# Patient Record
Sex: MALE | Race: WHITE | NOT HISPANIC OR LATINO | Employment: FULL TIME | URBAN - METROPOLITAN AREA
[De-identification: names, ages, dates, MRNs, and addresses within clinical notes are randomized per-mention and may not be internally consistent; named-entity substitution may affect disease eponyms.]

---

## 2024-06-14 ENCOUNTER — OFFICE VISIT (OUTPATIENT)
Age: 64
End: 2024-06-14

## 2024-06-14 VITALS
SYSTOLIC BLOOD PRESSURE: 125 MMHG | BODY MASS INDEX: 31.67 KG/M2 | HEART RATE: 64 BPM | WEIGHT: 226.2 LBS | DIASTOLIC BLOOD PRESSURE: 82 MMHG | HEIGHT: 71 IN | OXYGEN SATURATION: 98 %

## 2024-06-14 DIAGNOSIS — Z11.59 NEED FOR HEPATITIS C SCREENING TEST: ICD-10-CM

## 2024-06-14 DIAGNOSIS — Z11.4 SCREENING FOR HIV (HUMAN IMMUNODEFICIENCY VIRUS): ICD-10-CM

## 2024-06-14 DIAGNOSIS — Z13.6 SCREENING FOR CARDIOVASCULAR CONDITION: ICD-10-CM

## 2024-06-14 DIAGNOSIS — Z12.5 SCREENING FOR MALIGNANT NEOPLASM OF PROSTATE: ICD-10-CM

## 2024-06-14 DIAGNOSIS — Z12.11 SCREENING FOR MALIGNANT NEOPLASM OF COLON: ICD-10-CM

## 2024-06-14 DIAGNOSIS — J30.1 SEASONAL ALLERGIC RHINITIS DUE TO POLLEN: Primary | ICD-10-CM

## 2024-06-14 PROCEDURE — 99203 OFFICE O/P NEW LOW 30 MIN: CPT | Performed by: FAMILY MEDICINE

## 2024-06-14 NOTE — ASSESSMENT & PLAN NOTE
Avoid irritants. Avoid touching face and rubbing eyes or nose. Close windows in home and car when pollen counts are higher.

## 2024-06-14 NOTE — PROGRESS NOTES
"Ambulatory Visit  Name: Андрей Mcallister      : 1960      MRN: [id number]  Encounter Provider: Luis Alberto Valverde MD  Encounter Date: 2024   Encounter department: [facility]IMARY [facility]ARE    Assessment & Plan   1. Seasonal allergic rhinitis due to pollen  Assessment & Plan:  Avoid irritants. Avoid touching face and rubbing eyes or nose. Close windows in home and car when pollen counts are higher.   2. Screening for cardiovascular condition  -     Lipid panel; Future  -     Comprehensive metabolic panel; Future; Expected date: 2024  3. Screening for malignant neoplasm of prostate  -     Ambulatory Referral to Gastroenterology; Future  4. Screening for malignant neoplasm of colon  -     PSA, total and free; Future  5. Need for hepatitis C screening test  -     Hepatitis C Antibody; Future  6. Screening for HIV (human immunodeficiency virus)  -     HIV 1/2 AG/AB w Reflex [facility] for 2 yr old and above; Future       History of Present Illness     New patient. Here to get established. Denies chest pain, shortness of breath, headache, or visual symptoms. Reviewed and updated PMHx, PSHx, Family Hx, Allergies, and Meds.  Eats healthy, balanced. Goes to gym 4 times a week. On no medication. No doctor for 15 days.         Review of Systems   Constitutional:  Negative for fatigue.   Respiratory:  Negative for shortness of breath.    Cardiovascular:  Negative for chest pain.   Gastrointestinal:  Negative for abdominal pain, constipation and diarrhea.   Genitourinary:  Negative for dysuria.   Neurological:  Negative for dizziness.     Medical History Reviewed by provider this encounter:  Tobacco  Allergies  Meds  Problems  Med Hx  Surg Hx  Fam Hx  Soc   Hx      Objective     /82 (BP Location: Right arm, Patient Position: Sitting, Cuff Size: Standard)   Pulse 64   Ht 5' 11\" (1.803 m)   Wt 103 kg (226 lb 3.2 oz)   SpO2 98%   BMI 31.55 kg/m²     Physical Exam  Vitals and nursing note " reviewed.   Constitutional:       Appearance: He is well-developed.   HENT:      Head: Normocephalic.   Eyes:      General: Scleral icterus: scr prostate can.      Conjunctiva/sclera: Conjunctivae normal.   Cardiovascular:      Rate and Rhythm: Normal rate and regular rhythm.   Pulmonary:      Breath sounds: Normal breath sounds.   Abdominal:      Palpations: Abdomen is soft.   Musculoskeletal:      Cervical back: Neck supple.   Neurological:      Mental Status: He is alert.       Administrative Statements

## 2024-08-07 LAB
EXTERNAL HIV CONFIRMATION: NORMAL
EXTERNAL HIV SCREEN: NORMAL
HCV AB SER-ACNC: NEGATIVE

## 2024-09-13 ENCOUNTER — OFFICE VISIT (OUTPATIENT)
Age: 64
End: 2024-09-13
Payer: COMMERCIAL

## 2024-09-13 VITALS
HEART RATE: 66 BPM | OXYGEN SATURATION: 95 % | SYSTOLIC BLOOD PRESSURE: 150 MMHG | WEIGHT: 230.8 LBS | RESPIRATION RATE: 18 BRPM | DIASTOLIC BLOOD PRESSURE: 82 MMHG | HEIGHT: 71 IN | BODY MASS INDEX: 32.31 KG/M2

## 2024-09-13 DIAGNOSIS — E78.00 HYPERCHOLESTEROLEMIA: ICD-10-CM

## 2024-09-13 DIAGNOSIS — Z00.00 ANNUAL PHYSICAL EXAM: Primary | ICD-10-CM

## 2024-09-13 DIAGNOSIS — R73.01 IMPAIRED FASTING BLOOD SUGAR: ICD-10-CM

## 2024-09-13 DIAGNOSIS — R74.8 ELEVATED LIVER ENZYMES: ICD-10-CM

## 2024-09-13 DIAGNOSIS — J30.1 SEASONAL ALLERGIC RHINITIS DUE TO POLLEN: ICD-10-CM

## 2024-09-13 DIAGNOSIS — R97.20 PSA ELEVATION: ICD-10-CM

## 2024-09-13 DIAGNOSIS — R35.0 FREQUENCY OF URINATION AND POLYURIA: ICD-10-CM

## 2024-09-13 DIAGNOSIS — R35.89 FREQUENCY OF URINATION AND POLYURIA: ICD-10-CM

## 2024-09-13 PROBLEM — J30.9 ALLERGIC RHINITIS: Status: ACTIVE | Noted: 2024-09-13

## 2024-09-13 PROCEDURE — 99396 PREV VISIT EST AGE 40-64: CPT | Performed by: FAMILY MEDICINE

## 2024-09-13 RX ORDER — MULTIVITAMIN
1 CAPSULE ORAL DAILY
COMMUNITY

## 2024-09-13 RX ORDER — BIOTIN 10 MG
TABLET ORAL
COMMUNITY

## 2024-09-13 RX ORDER — CHLORAL HYDRATE 500 MG
1000 CAPSULE ORAL DAILY
COMMUNITY

## 2024-09-13 NOTE — PROGRESS NOTES
Adult Annual Physical  Name: Андрей Mcallister      : 1960      MRN: [id number]  Encounter Provider: Luis Alberto Valverde MD  Encounter Date: 2024   Encounter department: [facility] PRIMARY CARE    Assessment & Plan  Annual physical exam         Seasonal allergic rhinitis due to pollen  Avoid irritants. Avoid touching face and rubbing eyes or nose. Close windows in home and car when pollen counts are higher.          Frequency of urination and polyuria  Refer to urology.       PSA elevation  Refer to urology.       Impaired fasting blood sugar  ADA. Will monitor.       Hypercholesterolemia  Low cholesterol diet.       Elevated liver enzymes  Stop alcohol       Immunizations and preventive care screenings were discussed with patient today. Appropriate education was printed on patient's after visit summary.        Counseling:  Exercise: the importance of regular exercise/physical activity was discussed. Recommend exercise 3-5 times per week for at least 30 minutes.          History of Present Illness     Adult Annual Physical:  Patient presents for annual physical.   Андрей Mcallister is here for annual exam.   Pt states he would like to go over the blood work. Pt states he is still having pain in the prostate area with frequent urination.Denies chest pain, shortness of breath, headache, or visual symptoms. Reviewed and updated PMHx, PSHx, Family Hx, Allergies, and Meds.  Had 6 polyps removed and due for colonoscopy in 6 mos. .     Diet and Physical Activity:  - Diet/Nutrition: consuming 3-5 servings of fruits/vegetables daily and well balanced diet.  - Exercise: 3-4 times a week on average.    Depression Screening:  - PHQ-2 Score: 0    General Health:  - Sleep: 7-8 hours of sleep on average.  - Hearing: normal hearing right ear and normal hearing left ear.  - Vision: wears glasses and contacts.  - Dental: no dental visits for > 1 year.     Health:  - History of STDs: no.   - Urinary symptoms:  "urinary frequency and nocturia.     Advanced Care Planning:  - Has an advanced directive?: yes    - Has a durable medical POA?: no    - ACP document given to patient?: no      Review of Systems   Constitutional:  Negative for fatigue.   Respiratory:  Negative for shortness of breath.    Cardiovascular:  Negative for chest pain.   Gastrointestinal:  Negative for abdominal pain, constipation and diarrhea.   Genitourinary:  Negative for dysuria.   Neurological:  Negative for dizziness.         Objective     /82 (BP Location: Right arm, Patient Position: Sitting, Cuff Size: Large)   Pulse 66   Resp 18   Ht 5' 11\" (1.803 m)   Wt 105 kg (230 lb 12.8 oz)   SpO2 95%   BMI 32.19 kg/m²     Physical Exam  Vitals and nursing note reviewed.   Constitutional:       Appearance: He is well-developed.   HENT:      Head: Normocephalic.   Eyes:      Conjunctiva/sclera: Conjunctivae normal.   Cardiovascular:      Rate and Rhythm: Normal rate and regular rhythm.   Pulmonary:      Breath sounds: Normal breath sounds.   Abdominal:      Palpations: Abdomen is soft.   Musculoskeletal:      Cervical back: Neck supple.   Neurological:      Mental Status: He is alert.         "

## 2024-09-13 NOTE — PATIENT INSTRUCTIONS
"Patient Education     Routine physical for adults   The Basics   Written by the doctors and editors at [facility]   What is a physical? -- A physical is a routine visit, or \"check-up,\" with your doctor. You might also hear it called a \"wellness visit\" or \"preventive visit.\"  During each visit, the doctor will:   Ask about your physical and mental health   Ask about your habits, behaviors, and lifestyle   Do an exam   Give you vaccines if needed   Talk to you about any medicines you take   Give advice about your health   Answer your questions  Getting regular check-ups is an important part of taking care of your health. It can help your doctor find and treat any problems you have. But it's also important for preventing health problems.  A routine physical is different from a \"sick visit.\" A sick visit is when you see a doctor because of a health concern or problem. Since physicals are scheduled ahead of time, you can think about what you want to ask the doctor.  How often should I get a physical? -- It depends on your age and health. In general, for people age 21 years and older:   If you are younger than 50 years, you might be able to get a physical every 3 years.   If you are 50 years or older, your doctor might recommend a physical every year.  If you have an ongoing health condition, like diabetes or high blood pressure, your doctor will probably want to see you more often.  What happens during a physical? -- In general, each visit will include:   Physical exam - The doctor or nurse will check your height, weight, heart rate, and blood pressure. They will also look at your eyes and ears. They will ask about how you are feeling and whether you have any symptoms that bother you.   Medicines - It's a good idea to bring a list of all the medicines you take to each doctor visit. Your doctor will talk to you about your medicines and answer any questions. Tell them if you are having any side effects that bother you. You " "should also tell them if you are having trouble paying for any of your medicines.   Habits and behaviors - This includes:   Your diet   Your exercise habits   Whether you smoke, drink alcohol, or use drugs   Whether you are sexually active   Whether you feel safe at home  Your doctor will talk to you about things you can do to improve your health and lower your risk of health problems. They will also offer help and support. For example, if you want to quit smoking, they can give you advice and might prescribe medicines. If you want to improve your diet or get more physical activity, they can help you with this, too.   Lab tests, if needed - The tests you get will depend on your age and situation. For example, your doctor might want to check your:   Cholesterol   Blood sugar   Iron level   Vaccines - The recommended vaccines will depend on your age, health, and what vaccines you already had. Vaccines are very important because they can prevent certain serious or deadly infections.   Discussion of screening - \"Screening\" means checking for diseases or other health problems before they cause symptoms. Your doctor can recommend screening based on your age, risk, and preferences. This might include tests to check for:   Cancer, such as breast, prostate, cervical, ovarian, colorectal, prostate, lung, or skin cancer   Sexually transmitted infections, such as chlamydia and gonorrhea   Mental health conditions like depression and anxiety  Your doctor will talk to you about the different types of screening tests. They can help you decide which screenings to have. They can also explain what the results might mean.   Answering questions - The physical is a good time to ask the doctor or nurse questions about your health. If needed, they can refer you to other doctors or specialists, too.  Adults older than 65 years often need other care, too. As you get older, your doctor will talk to you about:   How to prevent falling at " home   Hearing or vision tests   Memory testing   How to take your medicines safely   Making sure that you have the help and support you need at home  All topics are updated as new evidence becomes available and our peer review process is complete.  This topic retrieved from [employer] on: May 02, 2024.  Topic 219206 Version 1.0  Release: 32.4.3 - C32.122  © 2024 UpToDate, Inc. and/or its affiliates. All rights reserved.  Consumer Information Use and Disclaimer   Disclaimer: This generalized information is a limited summary of diagnosis, treatment, and/or medication information. It is not meant to be comprehensive and should be used as a tool to help the user understand and/or assess potential diagnostic and treatment options. It does NOT include all information about conditions, treatments, medications, side effects, or risks that may apply to a specific patient. It is not intended to be medical advice or a substitute for the medical advice, diagnosis, or treatment of a health care provider based on the health care provider's examination and assessment of a patient's specific and unique circumstances. Patients must speak with a health care provider for complete information about their health, medical questions, and treatment options, including any risks or benefits regarding use of medications. This information does not endorse any treatments or medications as safe, effective, or approved for treating a specific patient. UpToDate, Inc. and its affiliates disclaim any warranty or liability relating to this information or the use thereof.The use of this information is governed by the Terms of Use, available at https://www.woltersTeamer.netuwer.com/en/know/clinical-effectiveness-terms. 2024© UpToDate, Inc. and its affiliates and/or licensors. All rights reserved.  Copyright   © 2024 UpToDate, Inc. and/or its affiliates. All rights reserved.

## 2024-09-26 ENCOUNTER — TELEPHONE (OUTPATIENT)
Age: 64
End: 2024-09-26

## 2024-09-26 DIAGNOSIS — Z12.5 SCREENING FOR MALIGNANT NEOPLASM OF PROSTATE: Primary | ICD-10-CM

## 2024-09-26 NOTE — TELEPHONE ENCOUNTER
Patient calling because insurance partially denied labs, $700 in lab fees. Please advise if lab orders can be changed from screening to preventative. Resubmit to billing for claims reprocessing. Call patient and advise.

## 2024-09-27 NOTE — ADDENDUM NOTE
Addended by: CHRISTOPHE VITALE on: 9/27/2024 02:50 PM     Modules accepted: Orders     Patient ID: Jason Ceballos is a 49 y.o. male.    Procedures

## 2025-01-17 ENCOUNTER — RA CDI HCC (OUTPATIENT)
Dept: OTHER | Facility: HOSPITAL | Age: 65
End: 2025-01-17

## 2025-01-21 ENCOUNTER — TELEPHONE (OUTPATIENT)
Dept: ADMINISTRATIVE | Facility: OTHER | Age: 65
End: 2025-01-21

## 2025-01-21 RX ORDER — TAMSULOSIN HYDROCHLORIDE 0.4 MG/1
0.4 CAPSULE ORAL
COMMUNITY
Start: 2024-11-30

## 2025-01-21 NOTE — TELEPHONE ENCOUNTER
----- Message from Maryjane BONE sent at 1/20/2025  1:20 PM EST -----  Regarding: Care Gap Request  01/20/25 1:20 PM    Hello, our patient Андрей Mcallister has had Hepatitis C and HIV completed/performed. Please assist in updating the patient chart by pulling the document from Lab Tab within Chart Review. The date of service is 08/07/2024.     Thank you,  Maryjane Carrillo  [facility] PRIMARY [facility]ARE

## 2025-01-21 NOTE — TELEPHONE ENCOUNTER
----- Message from Maryjane BONE sent at 1/20/2025  1:19 PM EST -----  Regarding: Care Gap Request  01/20/25 1:19 PM    Hello, our patient Андрей Mcallister has had CRC: Colonoscopy completed/performed. Please assist in updating the patient chart by pulling the document from the Media Tab. The date of service is 07/24/2024.     Thank you,  Maryjane Carrillo [facility]

## 2025-01-21 NOTE — TELEPHONE ENCOUNTER
Upon review of the In Basket request we were able to locate, review, and update the patient chart as requested for CRC: Colonoscopy, Hepatitis C , and HIV.    Colonoscopy report states repeat colonoscopy in 6 months -  set for patient to be due in 1 year as 6 months was not an option, just fyi.     Any additional questions or concerns should be emailed to the Practice Liaisons via the appropriate education email address, please do not reply via In Basket.    Thank you  Tania Simms MA   [facility]

## 2025-01-24 ENCOUNTER — OFFICE VISIT (OUTPATIENT)
Age: 65
End: 2025-01-24
Payer: COMMERCIAL

## 2025-01-24 VITALS
HEIGHT: 71 IN | DIASTOLIC BLOOD PRESSURE: 85 MMHG | SYSTOLIC BLOOD PRESSURE: 130 MMHG | BODY MASS INDEX: 32.81 KG/M2 | WEIGHT: 234.4 LBS | HEART RATE: 71 BPM | RESPIRATION RATE: 18 BRPM | OXYGEN SATURATION: 97 %

## 2025-01-24 DIAGNOSIS — R97.20 PSA ELEVATION: ICD-10-CM

## 2025-01-24 DIAGNOSIS — E78.00 HYPERCHOLESTEROLEMIA: Primary | ICD-10-CM

## 2025-01-24 DIAGNOSIS — R73.01 IMPAIRED FASTING BLOOD SUGAR: ICD-10-CM

## 2025-01-24 DIAGNOSIS — J30.1 SEASONAL ALLERGIC RHINITIS DUE TO POLLEN: ICD-10-CM

## 2025-01-24 PROCEDURE — 99214 OFFICE O/P EST MOD 30 MIN: CPT | Performed by: FAMILY MEDICINE

## 2025-01-24 NOTE — ASSESSMENT & PLAN NOTE
Low cholesterol diet. Encourage vegetables, fruit, and whole grains. Exercise.  Continue fish oil

## 2025-01-24 NOTE — PROGRESS NOTES
"Name: Андрей Mcallister      : 1960      MRN: [id number]  Encounter Provider: Luis Alberto Valverde MD  Encounter Date: 2025   Encounter department: [facility] PR[facility]  :  Assessment & Plan  Hypercholesterolemia  Low cholesterol diet. Encourage vegetables, fruit, and whole grains. Exercise.  Continue fish oil       Impaired fasting blood sugar  ADA diet, carb counting, low fat, high fiber intake. Water or low calorie drinks. Aerobic exercise. Weight loss.        Seasonal allergic rhinitis due to pollen  Limit exposure to individual allergens and irritants.         PSA elevation  Had biopsy, benign.              History of Present Illness     Андрей Mcallister is here for chronic conditions f/u. Denies chest pain, shortness of breath, headache, or visual symptoms. Reviewed and updated PMHx, PSHx, Family Hx, Allergies, and Meds.  Drinks daily. Eating healthy. Had biopsy of prostate which was negative. Due for colonoscopy soon as per patient.       Review of Systems   Constitutional:  Negative for fatigue.   Respiratory:  Negative for shortness of breath.    Cardiovascular:  Negative for chest pain.   Gastrointestinal:  Negative for abdominal pain, constipation and diarrhea.   Genitourinary:  Negative for dysuria.   Neurological:  Negative for dizziness.       Objective   /85 (BP Location: Right arm, Patient Position: Sitting, Cuff Size: Standard)   Pulse 71   Resp 18   Ht 5' 11\" (1.803 m)   Wt 106 kg (234 lb 6.4 oz)   SpO2 97%   BMI 32.69 kg/m²      Physical Exam  Vitals and nursing note reviewed.   Constitutional:       Appearance: He is well-developed.   HENT:      Head: Normocephalic.   Eyes:      Conjunctiva/sclera: Conjunctivae normal.   Cardiovascular:      Rate and Rhythm: Normal rate and regular rhythm.   Pulmonary:      Breath sounds: Normal breath sounds.   Abdominal:      Palpations: Abdomen is soft.   Musculoskeletal:      Cervical back: Neck supple.   Neurological:      " Mental Status: He is alert.

## 2025-06-02 ENCOUNTER — OFFICE VISIT (OUTPATIENT)
Age: 65
End: 2025-06-02
Payer: COMMERCIAL

## 2025-06-02 VITALS
SYSTOLIC BLOOD PRESSURE: 120 MMHG | DIASTOLIC BLOOD PRESSURE: 84 MMHG | BODY MASS INDEX: 31.72 KG/M2 | HEIGHT: 71 IN | HEART RATE: 70 BPM | OXYGEN SATURATION: 98 % | WEIGHT: 226.6 LBS

## 2025-06-02 DIAGNOSIS — J30.1 SEASONAL ALLERGIC RHINITIS DUE TO POLLEN: ICD-10-CM

## 2025-06-02 DIAGNOSIS — G56.03 BILATERAL CARPAL TUNNEL SYNDROME: ICD-10-CM

## 2025-06-02 DIAGNOSIS — R73.01 IMPAIRED FASTING BLOOD SUGAR: Primary | ICD-10-CM

## 2025-06-02 DIAGNOSIS — E78.00 HYPERCHOLESTEROLEMIA: ICD-10-CM

## 2025-06-02 PROCEDURE — 99214 OFFICE O/P EST MOD 30 MIN: CPT | Performed by: FAMILY MEDICINE

## 2025-06-02 NOTE — PROGRESS NOTES
"Name: Андрей Mcallister      : 1960      MRN: [id number]  Encounter Provider: Luis Alberto Valverde MD  Encounter Date: 2025   Encounter department: [facility]  :  Assessment & Plan  Impaired fasting blood sugar  ADA diet, carb counting, low fat, high fiber intake. Water or low calorie drinks. Aerobic exercise. Maintain healthy weight.    Orders:  •  Basic metabolic panel; Future    Hypercholesterolemia  Low cholesterol diet. Encourage vegetables, fruit, and whole grains. Exercise.    Orders:  •  Lipid panel; Future  •  Basic metabolic panel; Future    Seasonal allergic rhinitis due to pollen  Limit exposure to individual allergens and irritants.         Bilateral carpal tunnel syndrome  Tried wrist splints.   Orders:  •  Ambulatory Referral to Orthopedic Surgery; Future           History of Present Illness     Андрей Mcallister is here for chronic conditions f/u. Patient is having pain b/l wrist. Wants a referral for [facility].       Review of Systems   Constitutional:  Negative for fatigue.   Respiratory:  Negative for shortness of breath.    Cardiovascular:  Negative for chest pain.   Gastrointestinal:  Negative for abdominal pain, constipation and diarrhea.   Genitourinary:  Negative for dysuria.   Musculoskeletal:  Positive for arthralgias (Wrists).   Neurological:  Negative for dizziness.       Objective   /84 (BP Location: Left arm, Patient Position: Sitting, Cuff Size: Standard)   Pulse 70   Ht 5' 11\" (1.803 m)   Wt 103 kg (226 lb 9.6 oz)   SpO2 98%   BMI 31.60 kg/m²      Physical Exam  Vitals and nursing note reviewed.   Constitutional:       Appearance: He is well-developed.   HENT:      Head: Normocephalic.     Eyes:      Conjunctiva/sclera: Conjunctivae normal.       Cardiovascular:      Rate and Rhythm: Normal rate and regular rhythm.   Pulmonary:      Breath sounds: Normal breath sounds.   Abdominal:      Palpations: Abdomen is soft.     Musculoskeletal:      Cervical " back: Neck supple.     Neurological:      Mental Status: He is alert.      Sensory: Sensory deficit present.      Comments: Positive Tinels and Phalens. Left side, median neuropathy.

## 2025-06-02 NOTE — ASSESSMENT & PLAN NOTE
Low cholesterol diet. Encourage vegetables, fruit, and whole grains. Exercise.    Orders:  •  Lipid panel; Future  •  Basic metabolic panel; Future

## 2025-06-02 NOTE — ASSESSMENT & PLAN NOTE
ADA diet, carb counting, low fat, high fiber intake. Water or low calorie drinks. Aerobic exercise. Maintain healthy weight.    Orders:  •  Basic metabolic panel; Future